# Patient Record
(demographics unavailable — no encounter records)

---

## 2025-04-21 NOTE — HISTORY OF PRESENT ILLNESS
[FreeTextEntry1] : moles [de-identified] : # Mole/skin check Concerns today: bump on back , toe fungus Sunscreen use: when exposed  Hx of blistering sun burns: no  Personal history of skin cancer: none  Family history of skin cancer: father

## 2025-04-21 NOTE — ASSESSMENT
[FreeTextEntry1] : 1) Nevi - Counseled about clinically benign lesions - Discussed regular OTC sunscreen use, SPF 30 or higher   2) Onychomycosis and tinea pedis, chronic, not at goal  - Reviewed risks (as well as mitigation strategies for adverse drug events as applicable), benefits, and alternatives of therapy  - Start terbinafine cream BID to AA for at least 4-6 weeks. would continue to use up to 2 weeks after resolution  - Start ciclopirox soln for the toenails. discussed low efficacy of treatment - Declined oral antifungals at this time  3) Irritated nevus, back  - Reviewed risks (as well as mitigation strategies for adverse drug events as applicable), benefits, and alternatives of therapy  - Discussed shave removal as the lesion is irritated. Patient deferred treatment today. May schedule an appointment in the future if interested   4) Skin cancer Screening - No lesions clinically concerning for malignancy today - Discussed regular self skin checks and ABCDEs of skin cancer screening - Discussed regular sunscreen use - Pt instructed to report any new or changing lesions  RTC 1 yr for FBSE or sooner if any concerns

## 2025-04-21 NOTE — PHYSICAL EXAM
[Full Body Skin Exam Performed] : performed [FreeTextEntry3] : Fairly uniform and regular brown macules and papules on the trunk and extremities  several dystrophic toenails, macerated interdigital toewebs